# Patient Record
Sex: MALE | Race: BLACK OR AFRICAN AMERICAN | URBAN - METROPOLITAN AREA
[De-identification: names, ages, dates, MRNs, and addresses within clinical notes are randomized per-mention and may not be internally consistent; named-entity substitution may affect disease eponyms.]

---

## 2023-09-10 ENCOUNTER — OFFICE VISIT (OUTPATIENT)
Dept: URGENT CARE | Facility: CLINIC | Age: 38
End: 2023-09-10
Payer: COMMERCIAL

## 2023-09-10 VITALS
HEART RATE: 96 BPM | RESPIRATION RATE: 18 BRPM | SYSTOLIC BLOOD PRESSURE: 135 MMHG | DIASTOLIC BLOOD PRESSURE: 88 MMHG | OXYGEN SATURATION: 99 % | TEMPERATURE: 99 F | BODY MASS INDEX: 30.66 KG/M2 | HEIGHT: 69 IN | WEIGHT: 207 LBS

## 2023-09-10 DIAGNOSIS — J02.9 SORE THROAT: Primary | ICD-10-CM

## 2023-09-10 DIAGNOSIS — B95.0 STREPTOCOCCUS INFECTION, GROUP A: ICD-10-CM

## 2023-09-10 LAB
CTP QC/QA: YES
CTP QC/QA: YES
MOLECULAR STREP A: POSITIVE
SARS-COV-2 AG RESP QL IA.RAPID: NEGATIVE

## 2023-09-10 PROCEDURE — 99213 OFFICE O/P EST LOW 20 MIN: CPT | Mod: S$GLB,,, | Performed by: FAMILY MEDICINE

## 2023-09-10 PROCEDURE — 87651 POCT STREP A MOLECULAR: ICD-10-PCS | Mod: QW,S$GLB,, | Performed by: FAMILY MEDICINE

## 2023-09-10 PROCEDURE — 99213 PR OFFICE/OUTPT VISIT, EST, LEVL III, 20-29 MIN: ICD-10-PCS | Mod: S$GLB,,, | Performed by: FAMILY MEDICINE

## 2023-09-10 PROCEDURE — 87811 SARS-COV-2 COVID19 W/OPTIC: CPT | Mod: QW,S$GLB,, | Performed by: FAMILY MEDICINE

## 2023-09-10 PROCEDURE — 87651 STREP A DNA AMP PROBE: CPT | Mod: QW,S$GLB,, | Performed by: FAMILY MEDICINE

## 2023-09-10 PROCEDURE — 87811 SARS CORONAVIRUS 2 ANTIGEN POCT, MANUAL READ: ICD-10-PCS | Mod: QW,S$GLB,, | Performed by: FAMILY MEDICINE

## 2023-09-10 RX ORDER — AMOXICILLIN 500 MG/1
500 TABLET, FILM COATED ORAL EVERY 12 HOURS
Qty: 20 TABLET | Refills: 0 | Status: SHIPPED | OUTPATIENT
Start: 2023-09-10 | End: 2023-09-20

## 2023-09-10 RX ORDER — HYDROXYUREA 500 MG/1
1000 CAPSULE ORAL
COMMUNITY
Start: 2023-08-02

## 2023-09-10 RX ORDER — IBUPROFEN 800 MG/1
800 TABLET ORAL
COMMUNITY
Start: 2023-08-02

## 2023-09-10 RX ORDER — FOLIC ACID 1 MG/1
1000 TABLET ORAL
COMMUNITY
Start: 2023-08-02

## 2023-09-10 RX ORDER — OXYCODONE AND ACETAMINOPHEN 7.5; 325 MG/1; MG/1
TABLET ORAL
COMMUNITY
Start: 2023-08-02

## 2023-09-10 NOTE — LETTER
September 10, 2023      [facility] [address] [address]  Phone: [phone]  Fax: [phone]       Patient: Armando Johnson   YOB: 1985  Date of Visit: 09/10/2023    To Whom It May Concern:    Maria Fernanda Johnson  was at [facility] on 09/10/2023. The patient may return to work/school on 9/12/23as long as symptoms improved and patient is without fever. If you have any questions or concerns, or if I can be of further assistance, please do not hesitate to contact me.    Sincerely,    Naomy Macedo, DO

## 2023-09-10 NOTE — PROGRESS NOTES
"Subjective:      Patient ID: Armando Johnson is a 38 y.o. male.    Vitals:  height is 5' 8.9" (1.75 m) and weight is 93.9 kg (207 lb). His oral temperature is 98.5 °F (36.9 °C). His blood pressure is 135/88 and his pulse is 96. His respiration is 18 and oxygen saturation is 99%.     Chief Complaint: Sore Throat    Patient has been having sore throat for the past 3 days. He has been taking ibuprofen 800 for his symptoms, which seemed to be helping his body aches but not so much the sore throat. Patient also has the sickle cell disease. Wife is concerned about him having a crisis on top of his symptoms.    Sore Throat   This is a new problem. The current episode started in the past 7 days. The problem has been unchanged. The pain is at a severity of 3/10. The pain is mild. Associated symptoms include swollen glands and trouble swallowing. Pertinent negatives include no coughing, diarrhea, drooling, ear discharge, ear pain, headaches, hoarse voice, plugged ear sensation, neck pain, shortness of breath, stridor or vomiting. Associated symptoms comments: Body aches . He has tried NSAIDs for the symptoms. The treatment provided mild relief.       HENT:  Positive for sore throat and trouble swallowing. Negative for ear pain, ear discharge and drooling.    Neck: Negative for neck pain.   Respiratory:  Negative for cough, shortness of breath and stridor.    Gastrointestinal:  Negative for vomiting and diarrhea.   Neurological:  Negative for headaches.      Objective:     Physical Exam  Constitutional: Pt oriented to person, place, and time.  Non-toxic appearance.   Patient does not appear ill. No distress. normal  HENT: No icterus or facial swelling appreciated  Head: Normocephalic and atraumatic.   Nose: No congestion.  Throat: + erythema and swelling bilateral tonsils with exudates No uvular shift or soft palate swelling. No stridor   Pulmonary/Chest: Effort normal. No stridor. No respiratory distress.   Abdominal: Normal " appearance. Abdomen exhibits no distension.   Musculoskeletal:         General: No swelling.   Neurological: no focal deficit. Patient is alert and oriented to person, place, and time.   Skin: Skin is not diaphoretic and not pale. no jaundice  Psychiatric: Patients behavior is normal. Mood, judgment and thought content normal.     Assessment:     1. Sore throat    2. Streptococcus infection, group A        Plan:       Sore throat  -     SARS Coronavirus 2 Antigen, POCT Manual Read- negative    -     POCT Strep A, Molecular- positive    -     amoxicillin (AMOXIL) 500 MG Tab; Take 1 tablet (500 mg total) by mouth every 12 (twelve) hours. for 10 days  Dispense: 20 tablet; Refill: 0    Streptococcus infection, group A  -     amoxicillin (AMOXIL) 500 MG Tab; Take 1 tablet (500 mg total) by mouth every 12 (twelve) hours. for 10 days  Dispense: 20 tablet; Refill: 0